# Patient Record
Sex: MALE | Race: AMERICAN INDIAN OR ALASKA NATIVE
[De-identification: names, ages, dates, MRNs, and addresses within clinical notes are randomized per-mention and may not be internally consistent; named-entity substitution may affect disease eponyms.]

---

## 2019-12-17 ENCOUNTER — HOSPITAL ENCOUNTER (EMERGENCY)
Dept: HOSPITAL 5 - ED | Age: 29
Discharge: HOME | End: 2019-12-17
Payer: SELF-PAY

## 2019-12-17 VITALS — DIASTOLIC BLOOD PRESSURE: 91 MMHG | SYSTOLIC BLOOD PRESSURE: 156 MMHG

## 2019-12-17 DIAGNOSIS — Z91.010: ICD-10-CM

## 2019-12-17 DIAGNOSIS — Z79.899: ICD-10-CM

## 2019-12-17 DIAGNOSIS — Z90.49: ICD-10-CM

## 2019-12-17 DIAGNOSIS — J45.21: Primary | ICD-10-CM

## 2019-12-17 PROCEDURE — 94644 CONT INHLJ TX 1ST HOUR: CPT

## 2019-12-17 PROCEDURE — 96365 THER/PROPH/DIAG IV INF INIT: CPT

## 2019-12-17 PROCEDURE — 94640 AIRWAY INHALATION TREATMENT: CPT

## 2019-12-17 PROCEDURE — 99283 EMERGENCY DEPT VISIT LOW MDM: CPT

## 2019-12-17 PROCEDURE — 71046 X-RAY EXAM CHEST 2 VIEWS: CPT

## 2019-12-17 NOTE — EMERGENCY DEPARTMENT REPORT
<YAZMIN DAIGLE - Last Filed: 19 17:36>





ED General Adult HPI





- General


Chief complaint: Upper Respiratory Infection


Stated complaint: COLD SYM/DIZZY


Time Seen by Provider: 19 14:43


Source: patient


Mode of arrival: Ambulatory


Limitations: No Limitations





- History of Present Illness


Initial comments: 





Patient is a 29-year-old  male who is presenting with cough cold

congestion flulike symptoms for the past 2 days.  Patient has body aches.  

Patient has been sweating profusely and states he's had a possible fever but has

not taken objective fever.  Patient's cough is productive of clear to yellow 

sputum.  Patient denies sore throat nausea vomiting or diarrhea at this time.


Severity scale (0 -10): 3





- Related Data


                                  Previous Rx's











 Medication  Instructions  Recorded  Last Taken  Type


 


ALBUTEROL Inhaler (OR & NICU) 1 puff IH QID PRN #8.5 gram 19 Unknown Rx





[ProAir HFA Inhaler]    


 


ALBUTEROL NEB's [Proventil 0.083% 2.5 mg IH TID PRN #1 box 19 Unknown Rx





NEBS]    


 


Nebulizer and Compressor [Easy Neb 1 each MC TID PRN #1 each 19 Unknown Rx





Compressor Nebulizer]    


 


predniSONE [Deltasone] 20 mg PO QDAY 5 Days #5 tab 19 Unknown Rx











                                    Allergies











Allergy/AdvReac Type Severity Reaction Status Date / Time


 


peanut Allergy  Shortness Verified 19 13:16





   of Breath  














ED Review of Systems


Comment: All other systems reviewed and negative





ED Past Medical Hx





- Past Medical History


Previous Medical History?: Yes


Additional medical history: "autoimmune pancreatitis"





- Surgical History


Past Surgical History?: Yes


Hx Cholecystectomy: Yes





- Medications


Home Medications: 


                                Home Medications











 Medication  Instructions  Recorded  Confirmed  Last Taken  Type


 


ALBUTEROL Inhaler (OR & NICU) 1 puff IH QID PRN #8.5 gram 19  Unknown Rx





[ProAir HFA Inhaler]     


 


ALBUTEROL NEB's [Proventil 0.083% 2.5 mg IH TID PRN #1 box 19  Unknown Rx





NEBS]     


 


Nebulizer and Compressor [Easy Neb 1 each MC TID PRN #1 each 19  Unknown 

Rx





Compressor Nebulizer]     


 


predniSONE [Deltasone] 20 mg PO QDAY 5 Days #5 tab 19  Unknown Rx














ED Physical Exam





- General


Limitations: No Limitations


General appearance: alert, in no apparent distress





- Head


Head exam: Present: atraumatic, normocephalic





- Eye


Eye exam: Present: normal appearance, PERRL, EOMI





- ENT


ENT exam: Present: mucous membranes moist





- Neck


Neck exam: Present: normal inspection





- Respiratory


Respiratory exam: Present: respiratory distress, wheezes.  Absent: normal lung 

sounds bilaterally, rales, rhonchi





- Cardiovascular


Cardiovascular Exam: Present: regular rate, normal rhythm, normal heart sounds. 

 Absent: systolic murmur, diastolic murmur, rubs, gallop





- GI/Abdominal


GI/Abdominal exam: Present: soft, normal bowel sounds.  Absent: distended, 

tenderness, guarding





- Rectal


Rectal exam: Present: deferred





- Extremities Exam


Extremities exam: Present: normal inspection





- Back Exam


Back exam: Present: normal inspection





- Neurological Exam


Neurological exam: Present: alert, oriented X3





- Psychiatric


Psychiatric exam: Present: normal affect, normal mood





- Skin


Skin exam: Present: warm, dry, intact, normal color.  Absent: rash





ED Course





- Reevaluation(s)


Reevaluation #1: 





19 17:37


Patient has very classic flulike symptoms.  Patient was wheezing and does have a

 history of asthma.  He was given hour-long treatment and the patient will be 

reassessed.





ED Disposition


Clinical Impression: 


 Asthma exacerbation





Disposition: DC-01 TO HOME OR SELFCARE


Condition: Stable


Prescriptions: 


predniSONE [Deltasone] 20 mg PO QDAY 5 Days #5 tab


Nebulizer and Compressor [Easy Neb Compressor Nebulizer] 1 each MC TID PRN #1 

each


 PRN Reason: Wheezing


ALBUTEROL Inhaler (OR & NICU) [ProAir HFA Inhaler] 1 puff IH QID PRN #8.5 gram


 PRN Reason: Shortness Of Breath


ALBUTEROL NEB's [Proventil 0.083% NEBS] 2.5 mg IH TID PRN #1 box


 PRN Reason: Wheezing





<CLARISSA CHENGSixto FRANCES - Last Filed: 19 21:16>





ED General Adult HPI





- History of Present Illness


Onset/Timin


Consistency: constant


Associated Symptoms: cough, shortness of breath





ED Review of Systems


ROS: 


Stated complaint: COLD SYM/DIZZY


Other details as noted in HPI








ED Physical Exam





- ENT


ENT exam: Present: mucous membranes moist





- Neck


Neck exam: Present: normal inspection





- Respiratory


Respiratory exam: Present: wheezes, chest wall tenderness.  Absent: respiratory 

distress





- Cardiovascular


Cardiovascular Exam: Present: normal rhythm, normal heart sounds.  Absent: 

systolic murmur, diastolic murmur, rubs, gallop





- Neurological Exam


Neurological exam: Present: CN II-XII intact, normal gait





- Psychiatric


Psychiatric exam: Absent: homicidal ideation, suicidal ideation





- Skin


Skin exam: Absent: diaphoretic





ED Course





                                   Vital Signs











  19





  14:46 16:55 19:25


 


Temperature 98.6 F  


 


Pulse Rate 88  


 


Pulse Rate [  65 





Bilateral]   


 


Respiratory 18  18





Rate   


 


Respiratory  18 





Rate [Bilateral   





]   


 


Blood Pressure 149/88  





[Right]   


 


O2 Sat by Pulse 98  





Oximetry   














ED Medical Decision Making





- Medical Decision Making





Patient is a 29-year-old  male who is presenting with cough cold

 congestion flulike symptoms for the past 2 days.  Patient has body aches.  

Patient has been sweating profusely and states he's had a possible fever but has

 not taken objective fever.  Patient's cough is productive of clear to yellow 

sputum.  Patient denies sore throat nausea vomiting or diarrhea at this time.








Patient was given 5 mg of albuterol one of Atrovent magnesium 2 mg IV, normal 

saline 1 L, steroids.  Patient to be discharged home with a nebulizer machine 

albuterol nebulizer nebs as well as a albuterol inhaler and prednisone 20 mg 

daily for the next 5 days.


Critical care attestation.: 


If time is entered above; I have spent that time in minutes in the direct care 

of this critically ill patient, excluding procedure time.








ED Disposition


Is pt being admited?: No


Does the pt Need Aspirin: No

## 2019-12-17 NOTE — XRAY REPORT
CHEST 2 VIEWS 



INDICATION / CLINICAL INFORMATION:

MAIN: sob fever; Pt received with c/o upper respiratory congestion and possible fever since last nigh
t. .



COMPARISON: 

None available.



FINDINGS:



SUPPORT DEVICES: None.

HEART / MEDIASTINUM: No significant abnormality. 

LUNGS / PLEURA: No significant pulmonary or pleural abnormality. No pneumothorax. 



ADDITIONAL FINDINGS: Right sided aortic arch.



IMPRESSION:

1. No acute findings.



Signer Name: Giovanny Cota MD 

Signed: 12/17/2019 7:38 PM

 Workstation Name: [id number]

## 2019-12-17 NOTE — EVENT NOTE
ED Screening Note


Date of service: 12/17/19


Time: 14:45


ED Screening Note: 


28 yo male with PMH of asthma. C/o of cough congestion and wheezing. Denies 

fever nausea and vomiting.  States he's traveled from [place] no inhalers with 

him.





This initial assessment/diagnostic orders/clinical plan/treatment(s) is/are 

subject to change based on patients health status, clinical progression and re-

assessment by fellow clinical providers in the ED. Further treatment and workup 

at subsequent clinical providers discretion. Patient/guardian urged not to elope

from the ED as their condition may be serious if not clinically assessed and 

managed. 





Initial orders include: 


Duoneb